# Patient Record
Sex: FEMALE | Race: WHITE | Employment: OTHER | URBAN - METROPOLITAN AREA
[De-identification: names, ages, dates, MRNs, and addresses within clinical notes are randomized per-mention and may not be internally consistent; named-entity substitution may affect disease eponyms.]

---

## 2017-06-09 PROBLEM — N81.4 UTEROVAGINAL PROLAPSE, UNSPECIFIED: Status: ACTIVE | Noted: 2017-06-09

## 2017-10-18 ENCOUNTER — LAB ENCOUNTER (OUTPATIENT)
Dept: LAB | Age: 82
End: 2017-10-18
Attending: FAMILY MEDICINE
Payer: MEDICARE

## 2017-10-18 DIAGNOSIS — I25.10 CAD (CORONARY ARTERY DISEASE): Primary | ICD-10-CM

## 2017-10-18 PROCEDURE — 84443 ASSAY THYROID STIM HORMONE: CPT

## 2017-10-18 PROCEDURE — 80053 COMPREHEN METABOLIC PANEL: CPT

## 2017-10-18 PROCEDURE — 36415 COLL VENOUS BLD VENIPUNCTURE: CPT

## 2017-10-18 PROCEDURE — 85025 COMPLETE CBC W/AUTO DIFF WBC: CPT

## 2018-02-04 ENCOUNTER — APPOINTMENT (OUTPATIENT)
Dept: GENERAL RADIOLOGY | Facility: HOSPITAL | Age: 83
End: 2018-02-04
Attending: EMERGENCY MEDICINE
Payer: MEDICARE

## 2018-02-04 ENCOUNTER — HOSPITAL ENCOUNTER (EMERGENCY)
Facility: HOSPITAL | Age: 83
Discharge: HOME | End: 2018-02-04
Attending: EMERGENCY MEDICINE
Payer: MEDICARE

## 2018-02-04 ENCOUNTER — APPOINTMENT (OUTPATIENT)
Dept: CT IMAGING | Facility: HOSPITAL | Age: 83
End: 2018-02-04
Attending: EMERGENCY MEDICINE
Payer: MEDICARE

## 2018-02-04 VITALS
TEMPERATURE: 99 F | OXYGEN SATURATION: 96 % | RESPIRATION RATE: 20 BRPM | SYSTOLIC BLOOD PRESSURE: 144 MMHG | DIASTOLIC BLOOD PRESSURE: 62 MMHG | HEIGHT: 57.99 IN | BODY MASS INDEX: 22.87 KG/M2 | HEART RATE: 90 BPM | WEIGHT: 108.94 LBS
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
## 2018-02-04 DIAGNOSIS — K59.00 CONSTIPATION, UNSPECIFIED CONSTIPATION TYPE: ICD-10-CM

## 2018-02-04 LAB
ALBUMIN SERPL-MCNC: 3.8 G/DL (ref 3.5–4.8)
ALP LIVER SERPL-CCNC: 63 U/L (ref 55–142)
ALT SERPL-CCNC: 17 U/L (ref 14–54)
AST SERPL-CCNC: 15 U/L (ref 15–41)
BASOPHILS # BLD AUTO: 0.02 X10(3) UL (ref 0–0.1)
BASOPHILS NFR BLD AUTO: 0.3 %
BILIRUB SERPL-MCNC: 0.5 MG/DL (ref 0.1–2)
BILIRUB UR QL STRIP.AUTO: NEGATIVE
BUN BLD-MCNC: 20 MG/DL (ref 8–20)
CALCIUM BLD-MCNC: 8.3 MG/DL (ref 8.3–10.3)
CHLORIDE: 103 MMOL/L (ref 101–111)
CLARITY UR REFRACT.AUTO: CLEAR
CO2: 24 MMOL/L (ref 22–32)
CREAT BLD-MCNC: 0.67 MG/DL (ref 0.55–1.02)
EOSINOPHIL # BLD AUTO: 0.13 X10(3) UL (ref 0–0.3)
EOSINOPHIL NFR BLD AUTO: 1.7 %
ERYTHROCYTE [DISTWIDTH] IN BLOOD BY AUTOMATED COUNT: 17 % (ref 11.5–16)
GLUCOSE BLD-MCNC: 132 MG/DL (ref 70–99)
GLUCOSE UR STRIP.AUTO-MCNC: NEGATIVE MG/DL
HCT VFR BLD AUTO: 35 % (ref 34–50)
HGB BLD-MCNC: 10.7 G/DL (ref 12–16)
HYALINE CASTS #/AREA URNS AUTO: PRESENT /LPF
IMMATURE GRANULOCYTE COUNT: 0.03 X10(3) UL (ref 0–1)
IMMATURE GRANULOCYTE RATIO %: 0.4 %
KETONES UR STRIP.AUTO-MCNC: NEGATIVE MG/DL
LYMPHOCYTES # BLD AUTO: 1.48 X10(3) UL (ref 0.9–4)
LYMPHOCYTES NFR BLD AUTO: 19.5 %
M PROTEIN MFR SERPL ELPH: 7.2 G/DL (ref 6.1–8.3)
MCH RBC QN AUTO: 24.5 PG (ref 27–33.2)
MCHC RBC AUTO-ENTMCNC: 30.6 G/DL (ref 31–37)
MCV RBC AUTO: 80.3 FL (ref 81–100)
MONOCYTES # BLD AUTO: 0.18 X10(3) UL (ref 0.1–0.6)
MONOCYTES NFR BLD AUTO: 2.4 %
NEUTROPHIL ABS PRELIM: 5.75 X10 (3) UL (ref 1.3–6.7)
NEUTROPHILS # BLD AUTO: 5.75 X10(3) UL (ref 1.3–6.7)
NEUTROPHILS NFR BLD AUTO: 75.7 %
NITRITE UR QL STRIP.AUTO: NEGATIVE
PH UR STRIP.AUTO: 6 [PH] (ref 4.5–8)
PLATELET # BLD AUTO: 302 10(3)UL (ref 150–450)
POTASSIUM SERPL-SCNC: 3.5 MMOL/L (ref 3.6–5.1)
PROT UR STRIP.AUTO-MCNC: NEGATIVE MG/DL
RBC # BLD AUTO: 4.36 X10(6)UL (ref 3.8–5.1)
RBC UR QL AUTO: NEGATIVE
RED CELL DISTRIBUTION WIDTH-SD: 50 FL (ref 35.1–46.3)
SODIUM SERPL-SCNC: 136 MMOL/L (ref 136–144)
SP GR UR STRIP.AUTO: 1.02 (ref 1–1.03)
UROBILINOGEN UR STRIP.AUTO-MCNC: 2 MG/DL
WBC # BLD AUTO: 7.6 X10(3) UL (ref 4–13)

## 2018-02-04 PROCEDURE — 71045 X-RAY EXAM CHEST 1 VIEW: CPT | Performed by: EMERGENCY MEDICINE

## 2018-02-04 PROCEDURE — 99285 EMERGENCY DEPT VISIT HI MDM: CPT

## 2018-02-04 PROCEDURE — 80053 COMPREHEN METABOLIC PANEL: CPT | Performed by: EMERGENCY MEDICINE

## 2018-02-04 PROCEDURE — 85025 COMPLETE CBC W/AUTO DIFF WBC: CPT | Performed by: EMERGENCY MEDICINE

## 2018-02-04 PROCEDURE — 87086 URINE CULTURE/COLONY COUNT: CPT | Performed by: EMERGENCY MEDICINE

## 2018-02-04 PROCEDURE — 81001 URINALYSIS AUTO W/SCOPE: CPT | Performed by: EMERGENCY MEDICINE

## 2018-02-04 PROCEDURE — 70450 CT HEAD/BRAIN W/O DYE: CPT | Performed by: EMERGENCY MEDICINE

## 2018-02-04 PROCEDURE — 99284 EMERGENCY DEPT VISIT MOD MDM: CPT

## 2018-02-04 PROCEDURE — 74018 RADEX ABDOMEN 1 VIEW: CPT | Performed by: EMERGENCY MEDICINE

## 2018-02-04 PROCEDURE — 96361 HYDRATE IV INFUSION ADD-ON: CPT

## 2018-02-04 PROCEDURE — 96360 HYDRATION IV INFUSION INIT: CPT

## 2018-02-04 NOTE — ED NOTES
Assisted pt to bathroom. Unable to urinate. Pt was able to have small bm. Walked back to bed with assistance. Tolerated well.

## 2018-02-04 NOTE — ED PROVIDER NOTES
Patient Seen in: [facility] Emergency Department    History   Patient presents with:  Fall (musculoskeletal, neurologic)  Constipation (gastrointestinal)    Stated Complaint: fall    HPI    35-year-old female presents for evaluation after a fall.   P Alert, oriented, no apparent distress  HEENT: Occipital scalp hematoma. Pupils equal reactive. Extraocular motions intact. Oropharynx clear. Neck: Supple, kyphosis  Lungs: Clear to auscultation bilaterally. Heart: Regular rate and rhythm.   Abdomen: S complaining of constipation but her abdomen was soft and nontender. She subsequently had a bowel movement here and felt better. Patient is neurologically intact and at baseline. No trauma on exam other than a scalp hematoma. CT scan was ordered.     CT

## 2018-05-23 ENCOUNTER — NURSE ONLY (OUTPATIENT)
Dept: LAB | Age: 83
End: 2018-05-23
Attending: FAMILY MEDICINE
Payer: MEDICARE

## 2018-05-23 DIAGNOSIS — I10 HTN (HYPERTENSION): ICD-10-CM

## 2018-05-23 DIAGNOSIS — G89.29 CHRONIC PAIN: ICD-10-CM

## 2018-05-23 DIAGNOSIS — R41.3 MEMORY CHANGE: Primary | ICD-10-CM

## 2018-05-23 PROCEDURE — 36415 COLL VENOUS BLD VENIPUNCTURE: CPT

## 2018-05-23 PROCEDURE — 80053 COMPREHEN METABOLIC PANEL: CPT

## 2018-05-23 PROCEDURE — 84443 ASSAY THYROID STIM HORMONE: CPT

## 2018-05-23 PROCEDURE — 85025 COMPLETE CBC W/AUTO DIFF WBC: CPT

## 2018-05-24 PROCEDURE — 87086 URINE CULTURE/COLONY COUNT: CPT | Performed by: FAMILY MEDICINE

## 2018-05-24 PROCEDURE — 81001 URINALYSIS AUTO W/SCOPE: CPT | Performed by: FAMILY MEDICINE

## 2018-05-26 ENCOUNTER — LAB REQUISITION (OUTPATIENT)
Dept: LAB | Facility: HOSPITAL | Age: 83
End: 2018-05-26
Attending: FAMILY MEDICINE
Payer: MEDICARE

## 2018-05-26 DIAGNOSIS — N39.0 URINARY TRACT INFECTION: ICD-10-CM

## 2018-06-03 ENCOUNTER — APPOINTMENT (OUTPATIENT)
Dept: GENERAL RADIOLOGY | Facility: HOSPITAL | Age: 83
DRG: 064 | End: 2018-06-03
Attending: EMERGENCY MEDICINE
Payer: MEDICARE

## 2018-06-03 ENCOUNTER — HOSPITAL ENCOUNTER (INPATIENT)
Facility: HOSPITAL | Age: 83
LOS: 2 days | Discharge: NURSING, REHAB OR LONG-TERM CARE | DRG: 064 | End: 2018-06-05
Attending: EMERGENCY MEDICINE | Admitting: HOSPITALIST
Payer: MEDICARE

## 2018-06-03 ENCOUNTER — APPOINTMENT (OUTPATIENT)
Dept: CT IMAGING | Facility: HOSPITAL | Age: 83
DRG: 064 | End: 2018-06-03
Attending: EMERGENCY MEDICINE
Payer: MEDICARE

## 2018-06-03 ENCOUNTER — APPOINTMENT (OUTPATIENT)
Dept: MRI IMAGING | Facility: HOSPITAL | Age: 83
DRG: 064 | End: 2018-06-03
Attending: Other
Payer: MEDICARE

## 2018-06-03 DIAGNOSIS — I63.9 ACUTE CVA (CEREBROVASCULAR ACCIDENT) (HCC): Primary | ICD-10-CM

## 2018-06-03 PROBLEM — R73.9 HYPERGLYCEMIA: Status: ACTIVE | Noted: 2018-06-03

## 2018-06-03 PROBLEM — I67.89 ISCHEMIC ENCEPHALOPATHY: Status: ACTIVE | Noted: 2018-06-03

## 2018-06-03 PROBLEM — R79.89 AZOTEMIA: Status: ACTIVE | Noted: 2018-06-03

## 2018-06-03 PROBLEM — D64.9 ANEMIA: Status: ACTIVE | Noted: 2018-06-03

## 2018-06-03 PROCEDURE — 70551 MRI BRAIN STEM W/O DYE: CPT | Performed by: OTHER

## 2018-06-03 PROCEDURE — 99223 1ST HOSP IP/OBS HIGH 75: CPT | Performed by: HOSPITALIST

## 2018-06-03 PROCEDURE — 0042T CT STROKE(DAWN BRAIN)CTA BRAIN/CTA NECK+PERF(CPT=70496/70498/0042T): CPT | Performed by: EMERGENCY MEDICINE

## 2018-06-03 PROCEDURE — 70498 CT ANGIOGRAPHY NECK: CPT | Performed by: EMERGENCY MEDICINE

## 2018-06-03 PROCEDURE — 70450 CT HEAD/BRAIN W/O DYE: CPT | Performed by: EMERGENCY MEDICINE

## 2018-06-03 PROCEDURE — 71045 X-RAY EXAM CHEST 1 VIEW: CPT | Performed by: EMERGENCY MEDICINE

## 2018-06-03 PROCEDURE — 70496 CT ANGIOGRAPHY HEAD: CPT | Performed by: EMERGENCY MEDICINE

## 2018-06-03 PROCEDURE — 99223 1ST HOSP IP/OBS HIGH 75: CPT | Performed by: OTHER

## 2018-06-03 RX ORDER — SODIUM CHLORIDE 9 MG/ML
INJECTION, SOLUTION INTRAVENOUS CONTINUOUS
Status: ACTIVE | OUTPATIENT
Start: 2018-06-03 | End: 2018-06-05

## 2018-06-03 RX ORDER — CALCIUM CARBONATE 500(1250)
1000 TABLET ORAL DAILY
Status: ON HOLD | COMMUNITY
End: 2018-06-04

## 2018-06-03 RX ORDER — DEXTROSE AND SODIUM CHLORIDE 5; .45 G/100ML; G/100ML
INJECTION, SOLUTION INTRAVENOUS CONTINUOUS
Status: ACTIVE | OUTPATIENT
Start: 2018-06-03 | End: 2018-06-03

## 2018-06-03 RX ORDER — ONDANSETRON 2 MG/ML
4 INJECTION INTRAMUSCULAR; INTRAVENOUS EVERY 6 HOURS PRN
Status: DISCONTINUED | OUTPATIENT
Start: 2018-06-03 | End: 2018-06-03

## 2018-06-03 RX ORDER — BRIMONIDINE TARTRATE 2 MG/ML
1 SOLUTION/ DROPS OPHTHALMIC 3 TIMES DAILY
Status: DISCONTINUED | OUTPATIENT
Start: 2018-06-03 | End: 2018-06-03 | Stop reason: SDUPTHER

## 2018-06-03 RX ORDER — POTASSIUM CHLORIDE 20 MEQ/1
40 TABLET, EXTENDED RELEASE ORAL EVERY 4 HOURS
Status: COMPLETED | OUTPATIENT
Start: 2018-06-03 | End: 2018-06-03

## 2018-06-03 RX ORDER — MORPHINE SULFATE 4 MG/ML
2 INJECTION, SOLUTION INTRAMUSCULAR; INTRAVENOUS EVERY 2 HOUR PRN
Status: DISCONTINUED | OUTPATIENT
Start: 2018-06-03 | End: 2018-06-05

## 2018-06-03 RX ORDER — ACETAMINOPHEN 325 MG/1
650 TABLET ORAL EVERY 6 HOURS PRN
Status: DISCONTINUED | OUTPATIENT
Start: 2018-06-03 | End: 2018-06-03

## 2018-06-03 RX ORDER — SENNOSIDES 8.6 MG
17.2 TABLET ORAL NIGHTLY
Status: DISCONTINUED | OUTPATIENT
Start: 2018-06-03 | End: 2018-06-05

## 2018-06-03 RX ORDER — SIMVASTATIN 20 MG
20 TABLET ORAL NIGHTLY
Status: ON HOLD | COMMUNITY
End: 2018-06-04

## 2018-06-03 RX ORDER — DIAZEPAM 2 MG/1
2 TABLET ORAL EVERY 6 HOURS PRN
Status: COMPLETED | OUTPATIENT
Start: 2018-06-03 | End: 2018-06-03

## 2018-06-03 RX ORDER — TIMOLOL MALEATE 5 MG/ML
1 SOLUTION/ DROPS OPHTHALMIC 2 TIMES DAILY
Status: DISCONTINUED | OUTPATIENT
Start: 2018-06-03 | End: 2018-06-05

## 2018-06-03 RX ORDER — PHENYLEPHRINE HCL IN 0.9% NACL 50MG/250ML
PLASTIC BAG, INJECTION (ML) INTRAVENOUS CONTINUOUS PRN
Status: DISCONTINUED | OUTPATIENT
Start: 2018-06-03 | End: 2018-06-05

## 2018-06-03 RX ORDER — METOCLOPRAMIDE HYDROCHLORIDE 5 MG/ML
10 INJECTION INTRAMUSCULAR; INTRAVENOUS EVERY 8 HOURS PRN
Status: DISCONTINUED | OUTPATIENT
Start: 2018-06-03 | End: 2018-06-05

## 2018-06-03 RX ORDER — DICLOFENAC 35 MG/1
75 CAPSULE ORAL DAILY
Status: ON HOLD | COMMUNITY
End: 2018-06-04

## 2018-06-03 RX ORDER — BRIMONIDINE TARTRATE 2 MG/ML
1 SOLUTION/ DROPS OPHTHALMIC 3 TIMES DAILY
Status: DISCONTINUED | OUTPATIENT
Start: 2018-06-03 | End: 2018-06-05

## 2018-06-03 RX ORDER — BISACODYL 10 MG
10 SUPPOSITORY, RECTAL RECTAL
Status: DISCONTINUED | OUTPATIENT
Start: 2018-06-03 | End: 2018-06-05

## 2018-06-03 RX ORDER — ONDANSETRON 2 MG/ML
4 INJECTION INTRAMUSCULAR; INTRAVENOUS EVERY 6 HOURS PRN
Status: DISCONTINUED | OUTPATIENT
Start: 2018-06-03 | End: 2018-06-05

## 2018-06-03 RX ORDER — ASPIRIN 300 MG
300 SUPPOSITORY, RECTAL RECTAL DAILY
Status: DISCONTINUED | OUTPATIENT
Start: 2018-06-03 | End: 2018-06-05

## 2018-06-03 RX ORDER — SODIUM PHOSPHATE, DIBASIC AND SODIUM PHOSPHATE, MONOBASIC 7; 19 G/133ML; G/133ML
1 ENEMA RECTAL ONCE AS NEEDED
Status: DISCONTINUED | OUTPATIENT
Start: 2018-06-03 | End: 2018-06-05

## 2018-06-03 RX ORDER — MORPHINE SULFATE 4 MG/ML
1 INJECTION, SOLUTION INTRAMUSCULAR; INTRAVENOUS EVERY 2 HOUR PRN
Status: DISCONTINUED | OUTPATIENT
Start: 2018-06-03 | End: 2018-06-05

## 2018-06-03 RX ORDER — OMEGA-3/DHA/EPA/FISH OIL 60 MG-90MG
500 CAPSULE ORAL DAILY
COMMUNITY
End: 2018-06-21

## 2018-06-03 RX ORDER — ACETAMINOPHEN 650 MG/1
650 SUPPOSITORY RECTAL EVERY 4 HOURS PRN
Status: DISCONTINUED | OUTPATIENT
Start: 2018-06-03 | End: 2018-06-05

## 2018-06-03 RX ORDER — ACETAMINOPHEN 325 MG/1
650 TABLET ORAL EVERY 4 HOURS PRN
Status: DISCONTINUED | OUTPATIENT
Start: 2018-06-03 | End: 2018-06-05

## 2018-06-03 RX ORDER — ATORVASTATIN CALCIUM 80 MG/1
80 TABLET, FILM COATED ORAL NIGHTLY
Status: DISCONTINUED | OUTPATIENT
Start: 2018-06-03 | End: 2018-06-04

## 2018-06-03 RX ORDER — DOCUSATE SODIUM 100 MG/1
100 CAPSULE, LIQUID FILLED ORAL 2 TIMES DAILY
Status: DISCONTINUED | OUTPATIENT
Start: 2018-06-03 | End: 2018-06-05

## 2018-06-03 RX ORDER — LABETALOL HYDROCHLORIDE 5 MG/ML
10 INJECTION, SOLUTION INTRAVENOUS EVERY 10 MIN PRN
Status: COMPLETED | OUTPATIENT
Start: 2018-06-03 | End: 2018-06-05

## 2018-06-03 RX ORDER — ACETAMINOPHEN 500 MG
500 TABLET ORAL EVERY 6 HOURS PRN
COMMUNITY

## 2018-06-03 RX ORDER — POLYETHYLENE GLYCOL 3350 17 G/17G
17 POWDER, FOR SOLUTION ORAL DAILY PRN
Status: DISCONTINUED | OUTPATIENT
Start: 2018-06-03 | End: 2018-06-05

## 2018-06-03 RX ORDER — PANTOPRAZOLE SODIUM 20 MG/1
20 TABLET, DELAYED RELEASE ORAL
Status: DISCONTINUED | OUTPATIENT
Start: 2018-06-03 | End: 2018-06-05

## 2018-06-03 RX ORDER — METOCLOPRAMIDE HYDROCHLORIDE 5 MG/ML
10 INJECTION INTRAMUSCULAR; INTRAVENOUS EVERY 8 HOURS PRN
Status: DISCONTINUED | OUTPATIENT
Start: 2018-06-03 | End: 2018-06-03

## 2018-06-03 RX ORDER — LATANOPROST 50 UG/ML
1 SOLUTION/ DROPS OPHTHALMIC NIGHTLY
Status: ON HOLD | COMMUNITY
End: 2018-06-04

## 2018-06-03 RX ORDER — LATANOPROST 50 UG/ML
1 SOLUTION/ DROPS OPHTHALMIC NIGHTLY
Status: DISCONTINUED | OUTPATIENT
Start: 2018-06-03 | End: 2018-06-04

## 2018-06-03 RX ORDER — ASPIRIN 325 MG
325 TABLET ORAL DAILY
Status: DISCONTINUED | OUTPATIENT
Start: 2018-06-03 | End: 2018-06-05

## 2018-06-03 RX ORDER — ASPIRIN 325 MG
325 TABLET ORAL DAILY
Status: ON HOLD | COMMUNITY
End: 2018-06-04

## 2018-06-03 RX ORDER — FAMOTIDINE 10 MG/ML
20 INJECTION, SOLUTION INTRAVENOUS DAILY
Status: DISCONTINUED | OUTPATIENT
Start: 2018-06-03 | End: 2018-06-03

## 2018-06-03 RX ORDER — FAMOTIDINE 20 MG/1
20 TABLET ORAL DAILY
Status: DISCONTINUED | OUTPATIENT
Start: 2018-06-03 | End: 2018-06-03

## 2018-06-03 NOTE — PLAN OF CARE
Patient admitted to 1770 with family at bedside. Patient alert and disoriented. NSR on tele. Room air. Stroke protocol started. NIH 11, facial droop, dysarthria, aphasia, right side weakness and drift, right side ataxia. MRI completed.  Positive result call

## 2018-06-03 NOTE — H&P
[facility]  History and Physical     Antony Melvin Patient Status:  Emergency    8/3/1926 MRN [id number]   Location 6[facility] Attending Jefferson Adams MD   Hosp Day # 0 PCP Janna Gutiérrez DO     Chief Complaint: Shellfish-Derived P*        Medications:    No current facility-administered medications on file prior to encounter. Current Outpatient Prescriptions on File Prior to Encounter:   AmLODIPine Besylate 2.5 MG Oral Tab TK 1 T PO D Disp:  Rfl: 11   COMBIGAN Lab  06/03/18   0701   TROP  <0.046       Imaging: Imaging data reviewed in Epic. ASSESSMENT / PLAN:     1. Acute CVA  1. CT and CTA reviewed  2. Stroke protocol  3. Neuro checks  4. PT/OT/ST when able  5. Neurology to eval  2.  Accelerated HTN, allo

## 2018-06-03 NOTE — ED PROVIDER NOTES
Patient Seen in: [facility] Emergency Department    History   Patient presents with:  Stroke (neurologic)  Fall (musculoskeletal, neurologic)  Upper Extremity Injury (musculoskeletal)    Stated Complaint:     HPI    Patient presents with acute right and well-nourished. HENT:   Head: Normocephalic. Mouth/Throat: Oropharynx is clear and moist.   Eyes: EOM are normal.   Neck: Normal range of motion. Neck supple.    Cardiovascular: Normal rate, regular rhythm, normal heart sounds and intact distal puls All other components within normal limits   TROPONIN I - Normal   PROTHROMBIN TIME (PT) - Normal   PTT, ACTIVATED - Normal   CBC WITH DIFFERENTIAL WITH PLATELET    Narrative:      The following orders were created for panel order CBC WITH DIFFERENTIAL WITH accident) [facility])  (primary encounter diagnosis)    Disposition:  Admit  6/3/2018  8:32 am    Follow-up:  No follow-up provider specified.       Medications Prescribed:  Current Discharge Medication List        Present on Admission  Date Reviewed: 6/3/2018

## 2018-06-03 NOTE — PROGRESS NOTES
06/03/18 1426   Clinical Encounter Type   Visited With Patient; Family  (Family at bedside)   Routine Visit (Responded to request for consult)   Continue Visiting No   Patient's Supportive Strategies/Resources  offered non anxious presence and pr

## 2018-06-03 NOTE — ED INITIAL ASSESSMENT (HPI)
Pt arrived via ems LW from [facility]  Last seen normal at 2000 Saturday and staff found on floor next to bed at 0610. Right side weakness, slurred speech and right facial droop   Right shoulder deformity pt reports she fell.  Daughter on the way now reports pt has

## 2018-06-03 NOTE — PROGRESS NOTES
[id number] [facility] Preliminary Note    Rose Coon Patient Status:  Inpatient    8/3/1926 MRN [id number]   [facility] 7NE-A Attending Colin Medina MD   Hosp Day # 0 PCP Renny Sullivan DO     REASON FOR EVALUATION:  Fa drugs. ALLERGIES:    Azithromycin              Shellfish-Derived P*        MEDICATIONS:    No current outpatient prescriptions on file.     Current Facility-Administered Medications:  Pantoprazole Sodium (PROTONIX) EC tab 20 mg 20 mg Oral QAM AC   0.9% old female in no acute distress. HEENT:  Normocephalic, atraumatic  LUNGS: Clear to auscultation bilaterally. HEART:  S1, S2, Regular rate and rhythm.   ABD: Soft, non tender  SKIN: Warm, dry, no rashes    NEUROLOGICAL:    This patient is alert and orien Neck 6/3/18 - left  M1 stenosis but no occlusion and questionable L MCA territory ischemia    ASSESSMENT:  Acute Fall and progressive speech disturbance it is unclear at this time what caused the fall underlying spinal stenosis vs acute ischemic event or h

## 2018-06-03 NOTE — CONSULTS
[id number] [facility] [place]  6/3/2018    11:04 AM      This is a 25-year-old female who resides in a nursing home and was noted to wake up with inability to stand and one-sided weakness this morning.   Amy Madrigal Could not identify picture and mistook them for something  CN:  WNL  Right arm and leg drift    Proportionately ataxic        IMPRESSION  Left hemispheric stroke   Based on CT perfusion, there is Tmax defect left parietal area  Intracranial stenosis M1 se

## 2018-06-03 NOTE — PROGRESS NOTES
Pharmacy note: Duplicate Proton Pump Inhibitor with Histamine 2 blocker. Nay Rodriguez is a 80year old female who has been prescribed both Famotidine and Protonix.   Pepcid was discontinued per P&T approved protocol for duplicate therapy in adult p

## 2018-06-03 NOTE — PHYSICAL THERAPY NOTE
Order received for Physical Therapy evaluation. Patient currently on bedrest per stroke protocol until 6/4 0652.   Will initiate PT services as appropriate or with upgraded activity order

## 2018-06-04 ENCOUNTER — APPOINTMENT (OUTPATIENT)
Dept: CV DIAGNOSTICS | Facility: HOSPITAL | Age: 83
DRG: 064 | End: 2018-06-04
Attending: PHYSICIAN ASSISTANT
Payer: MEDICARE

## 2018-06-04 PROCEDURE — 93306 TTE W/DOPPLER COMPLETE: CPT | Performed by: PHYSICIAN ASSISTANT

## 2018-06-04 PROCEDURE — 99233 SBSQ HOSP IP/OBS HIGH 50: CPT | Performed by: OTHER

## 2018-06-04 PROCEDURE — 99233 SBSQ HOSP IP/OBS HIGH 50: CPT | Performed by: HOSPITALIST

## 2018-06-04 RX ORDER — LATANOPROST 50 UG/ML
1 SOLUTION/ DROPS OPHTHALMIC NIGHTLY
Status: ON HOLD | COMMUNITY
End: 2018-06-04

## 2018-06-04 RX ORDER — ATORVASTATIN CALCIUM 40 MG/1
40 TABLET, FILM COATED ORAL NIGHTLY
Status: DISCONTINUED | OUTPATIENT
Start: 2018-06-04 | End: 2018-06-05

## 2018-06-04 RX ORDER — DICLOFENAC SODIUM 75 MG/1
75 TABLET, DELAYED RELEASE ORAL 2 TIMES DAILY
Status: ON HOLD | COMMUNITY
End: 2018-06-04

## 2018-06-04 RX ORDER — OMEPRAZOLE 20 MG/1
20 CAPSULE, DELAYED RELEASE ORAL DAILY
COMMUNITY

## 2018-06-04 RX ORDER — VIT A/VIT C/VIT E/ZINC/COPPER 2148-113
1 TABLET ORAL DAILY
COMMUNITY
End: 2018-06-21

## 2018-06-04 RX ORDER — TRAMADOL HYDROCHLORIDE 50 MG/1
50 TABLET ORAL EVERY 6 HOURS PRN
COMMUNITY
End: 2018-06-21

## 2018-06-04 RX ORDER — AMLODIPINE BESYLATE 2.5 MG/1
2.5 TABLET ORAL DAILY
Status: ON HOLD | COMMUNITY
End: 2018-06-05

## 2018-06-04 NOTE — PROGRESS NOTES
[id number] [facility] Progress Note    Maritzavaleria Lindriky Patient Status:  Inpatient    8/3/1926 MRN [id number]   [facility] 7NE-A Attending Emeli Urban MD   [facility] Day # 1 PCP Enrique Garcia DO         Subjective:  Marce Newell I have seen the patient independently, reviewed the history, labs and imaging, and agree with above note with following additions:  S:Reports feels stroner  O: /62 (BP Location: Right arm)   Pulse 75   Temp 98.1 °F (36.7 °C) (Oral)   Resp 18   SpO2 9

## 2018-06-04 NOTE — CM/SW NOTE
Pt is a 79 yo female admitted for CVA. Pt was +CVA. Pt lives in independent living at [facility]. Pt is  and has 5 children - 3 who live locally. Pt walks with a walker. PT/OT recommending acute rehab.   Referral made for ADOLFO LOFTON to

## 2018-06-04 NOTE — PROGRESS NOTES
[facility]  Progress Note     Latrice Dean Patient Status:  Inpatient    8/3/1926 MRN [id number]   [facility] 7NE-A Attending Parag Gil MD   Hosp Day # 1 PCP Bev Scott DO     Chief Complaint: Acute CVA    S: Pepper Ozuna 17.2 mg Oral Nightly   • docusate sodium  100 mg Oral BID   • latanoprost  1 drop Both Eyes Nightly   • brimonidine Tartrate  1 drop Both Eyes TID    And   • Timolol Maleate  1 drop Both Eyes BID       ASSESSMENT / PLAN:     1.  Acute CVA  1. CT and CTA rev

## 2018-06-04 NOTE — PLAN OF CARE
Assumed patient care at 0730. Vitals signs stable  Neuro checks Q4H   Patient alert to self, some expressive aphasia, right sided weakness, right arm droop, right facial droop. ECHO done.    PT/OT - recommending ADOLFO DAVID to eval   Tylenol given for back pa

## 2018-06-04 NOTE — OCCUPATIONAL THERAPY NOTE
OCCUPATIONAL THERAPY EVALUATION - INPATIENT     Room Number: 1281/2441-L  Evaluation Date: 6/4/2018  Type of Evaluation: Initial  Presenting Problem: acute infarct L MCA    Physician Order: IP Consult to Occupational Therapy  Reason for Therapy: ADL/IADL D when asked about vision.        OBJECTIVE  Precautions: Bed/chair alarm (SBP>120,R inattention,likely R sided visual perception issue)  Fall Risk: High fall risk    WEIGHT BEARING RESTRICTION  Weight Bearing Restriction: None                PAIN ASSESSMENT  increase time to extend fingers, needed support with wrist    Upper extremity strength is within functional limits except for the following;  Right Shoulder flexion  2-/5  Right Elbow flexion  3+/5  Right Wrist flexion  3+/5  Right   will use rae when presented to R sided visual field. When asked about double vision and specify visual issues, pt replied, \"There is a problem,\" but could not specify. Mod A to stand, mod A x 2 to take steps bed to chair, placed on L side.  Attempted to take side st deficits, maximizing patient’s ability to return safely to her prior level of function.     Patient Complexity  Occupational Profile/Medical History MODERATE - Expanded review of history including review of medical or therapy record   Specific performance d

## 2018-06-04 NOTE — PHYSICAL THERAPY NOTE
PHYSICAL THERAPY EVALUATION - INPATIENT     Room Number: 3427/3241-S  Evaluation Date: 6/4/2018  Type of Evaluation: Initial  Physician Order: PT Eval and Treat    Presenting Problem: L MCA CVA  Reason for Therapy: Mobility Dysfunction and Discharge extension  3-/5  Left Knee extension  4/5  Right Dorsiflexion  3-/5  Left Dorsiflexion  4-/5    BALANCE  Static Sitting: Fair -  Dynamic Sitting: Poor +  Static Standing: Poor -  Dynamic Standing: Dependent    ADDITIONAL TESTS  Additional Tests: Modified R place; Family present    ASSESSMENT   Patient is a 80year old female admitted on 6/3/2018 for L MCA CVA. Pertinent comorbidities and personal factors impacting therapy include HTN, glaucoma, back surgery, and h/o TSA.   In this PT evaluation, the patient p

## 2018-06-05 VITALS
OXYGEN SATURATION: 97 % | HEART RATE: 87 BPM | SYSTOLIC BLOOD PRESSURE: 106 MMHG | DIASTOLIC BLOOD PRESSURE: 72 MMHG | RESPIRATION RATE: 18 BRPM | TEMPERATURE: 98 F

## 2018-06-05 PROCEDURE — 99233 SBSQ HOSP IP/OBS HIGH 50: CPT | Performed by: OTHER

## 2018-06-05 PROCEDURE — 99239 HOSP IP/OBS DSCHRG MGMT >30: CPT | Performed by: HOSPITALIST

## 2018-06-05 RX ORDER — ASPIRIN 325 MG
325 TABLET ORAL DAILY
Qty: 30 TABLET | Refills: 0 | Status: SHIPPED | COMMUNITY
Start: 2018-06-06 | End: 2018-06-05

## 2018-06-05 RX ORDER — AMLODIPINE BESYLATE 2.5 MG/1
5 TABLET ORAL DAILY
Qty: 30 TABLET | Refills: 0 | Status: SHIPPED | OUTPATIENT
Start: 2018-06-05

## 2018-06-05 RX ORDER — ASPIRIN 325 MG
325 TABLET ORAL DAILY
Qty: 30 TABLET | Refills: 0 | Status: SHIPPED | OUTPATIENT
Start: 2018-06-06

## 2018-06-05 RX ORDER — ATORVASTATIN CALCIUM 40 MG/1
40 TABLET, FILM COATED ORAL NIGHTLY
Qty: 30 TABLET | Refills: 2 | Status: SHIPPED | OUTPATIENT
Start: 2018-06-05

## 2018-06-05 RX ORDER — AMLODIPINE BESYLATE 2.5 MG/1
5 TABLET ORAL DAILY
Qty: 30 TABLET | Refills: 0 | Status: SHIPPED | OUTPATIENT
Start: 2018-06-05 | End: 2018-06-05

## 2018-06-05 NOTE — PROGRESS NOTES
Okay to discharge per Neuro and cardiology. Report given to [facility] RN. Medications and follow up  reviewed with RN and patients family.    Sunita Al for transport

## 2018-06-05 NOTE — CONSULTS
[facility]  Cardiology Consultation    Dudley Landon Patient Status:  Inpatient    8/3/1926 MRN [id number]   [facility] 7NE-A Attending Freeman Zuniga MD   [address] Day # 2 PCP Demetrius Garcia DO     Reason for Consultation:  Marlo Boys date: HERNIA SURGERY  No date: RECONSTR TOTAL SHOULDER IMPLANT  Family History   Problem Relation Age of Onset   • Heart Disease Father    • Heart Disease Mother    • Diabetes Mother      Adult onset   • Heart Disease Brother       reports that she quit sm Tartrate (ALPHAGAN) 0.2 % ophthalmic solution 1 drop, 1 drop, Both Eyes, TID **AND** Timolol Maleate (TIMOPTIC) 0.5 % ophthalmic solution 1 drop, 1 drop, Both Eyes, BID    Review of Systems:  A comprehensive review of systems was negative if not otherwise stress test)  HTN    Recommendations:  - I reviewed echo images myself. I do not see a clot at the LV apex; certainly a clot could have been present in light of marked apical wall motion abnormality.   Risks and benefits of oral anticoagulation discussed w

## 2018-06-05 NOTE — OCCUPATIONAL THERAPY NOTE
OCCUPATIONAL THERAPY TREATMENT NOTE - INPATIENT     Room Number: 2790/9307-A  Session: 1   Number of Visits to Meet Established Goals: 7    Presenting Problem: acute infarct L MCA    History related to current admission: Pt was admitted on 6/3 from her sunil Little  -   Taking care of personal grooming such as brushing teeth?: A Little  -   Eating meals?: A Little    AM-PAC Score:  Score: 18  Approx Degree of Impairment: 46.65%  Standardized Score (AM-PAC Scale): 38.66  CMS Modifier (G-Code): CK    FUNCTIONAL level of impairment often benefit from discharging to [facility]. Pt will benefit from skilled OT services to maximize independence with ADLs.   Recommend discharge to ACUTE rehabilitation facility where pt can participate in a comprehensive and intensive thera perform upper body dressing:  with min assist  Patient will perform lower body dressing:  with mod assist and while in chair     Functional Transfer Goals  Patient will transfer to bedside commode:  with max assist     UE Exercise Program Goal  Patient mikie

## 2018-06-05 NOTE — CM/SW NOTE
Pt is ready for d/c today. Pt will go to . Called Danyelle at [facility] who said she has a bed for pt. Pt will into Room 1174. RN to call report to [phone]. Pt's family would like pt to go to [facility] by [employer].   Arranged Edw medicar to  pt at 6:30pm t

## 2018-06-05 NOTE — SLP NOTE
SPEECH/LANGUAGE/COGNITIVE EVALUATION - INPATIENT    Admission Date: 6/3/2018  Evaluation Date: 06/05/18    Reason for Referral: Stroke protocol;RN dysphagia screen (pending confirmation)    ASSESSMENT & PLAN   ASSESSMENT & IMPRESSION  Pt seen sitting uprig with no concerns of aspiration. Trial thin liquids via cup, self presented, with no overt s/s of aspiration. Stable pulmonary status. Will continue to follow.      Assessment(s) Administered: WAB Bedside Score     WAB Bedside Score: moderate-severe receptiv Plan/Recommendations: Dysphagia therapy; Aphasia therapy  Number of Visits to Meet Established Goals: 2  Follow Up Needed: Yes  SLP Follow-up Date: 06/06/18    Thank you for your referral.  If you have any questions please contact [address] [place] [address]

## 2018-06-05 NOTE — CONSULTS
[facility] Patient Status:  Inpatient    8/3/1926 MRN [id number]   [facility] 7NE-A Attending Alyssia Singh MD   [address] Day # 1 PCP Jorge Silver DO     Patient Identification  Vinod Edwards is a 80 year o maximal assist for dressing and bathing. Moderate assist for bed mobility. Not yet ambulatory.       Past Medical History:  @Medical hx@  Past Medical History:   Diagnosis Date   • Anemia    • Anemia    • Essential hypertension    • Glaucoma    • Osteopor Q6H PRN   Or      Metoclopramide HCl (REGLAN) injection 10 mg 10 mg Intravenous Q8H PRN   [] dextrose 5 %-0.45 % NaCl infusion  Intravenous Continuous   [COMPLETED] diazepam (VALIUM) tab 2 mg 2 mg Oral Q6H PRN   brimonidine Tartrate (ALPHAGAN) 0.2 % and tongue normal. Teeth and gums normal. Moist mucous membranes. Neck: No neck masses or thyroid enlargement/tenderness/nodules. Lungs:   Resonant, clear breath sounds, quiet accessory muscles. Chest wall:  No tenderness or deformity.    Cardio device needs. 24 hr rehab nursing also beneficial for medication management, pressure sore prevention, bowel and bladder management, skin management.      Physiatric medical oversight to monitor kidney function, cardiac function, pulmonary status

## 2018-06-05 NOTE — PHYSICAL THERAPY NOTE
PHYSICAL THERAPY TREATMENT NOTE - INPATIENT    Room Number: 9938/6579-G     Session: 1   Number of Visits to Meet Established Goals: 4    Presenting Problem: L MCA CVA    Problem List  Principal Problem:    Acute CVA (cerebrovascular accident) ([facility] [address]  Acti Score: 8   PT Approx Degree of Impairment Score: 86.62%   Standardized Score (AM-PAC Scale): 28.58   CMS Modifier (G-Code): CM    FUNCTIONAL ABILITY STATUS  Gait Assessment   Gait Assistance: Not tested  Distance (ft): dependent squat pivot  Assistive Polo independence/return to baseline. Recommend AR upon [facility] d/c.       DISCHARGE RECOMMENDATIONS  PT Discharge Recommendations: Acute rehabilitation     PLAN  PT Treatment Plan: Bed mobility; Neuromuscular re-educate;Strengthening;Transfer training;B

## 2018-06-05 NOTE — PROGRESS NOTES
[facility]  Progress Note     Carloz Palmer Patient Status:  Inpatient    8/3/1926 MRN [id number]   [facility] 7NE-A Attending Lexi Arrington MD   [address] Day # 2 PCP Jaida Barrett DO     Chief Complaint: Acute CVA    S: Sergio Morse sodium  100 mg Oral BID   • brimonidine Tartrate  1 drop Both Eyes TID    And   • Timolol Maleate  1 drop Both Eyes BID       ASSESSMENT / PLAN:     1. Acute CVA  1. CT and CTA reviewed  2. MRI with numerous focal acute infacts  3.  Neuro checks  4. 2D echo

## 2018-06-05 NOTE — PLAN OF CARE
Assumed care at [address]. Pt a/ox1. Nuero checks q4h per protocol. Pt w/ right sided weakness, right facial droop, aphasia. Labetalol prn given x1 for sbp >150. Incontinent of B/B. Kept clean and dry. IVF infusing per order. MJ eval done. SS to follow up.  Need

## 2018-06-05 NOTE — PLAN OF CARE
Problem: Impaired Communication  Goal: Patient will achieve maximal communication potential  Interventions:  - Provide modeling for options to improve word retrieval in known context  - Allow additional time for processing after asking questions or providi

## 2018-06-05 NOTE — PLAN OF CARE
Assumed patient care at 0730, BP better controlled. Hospitalist aware of BP overnight. Neuro checks q4H- Right sided weakness, right eye field cut. Right facial droop, right arm droop. Fall precautions maintained   MJ accepted.    Cardiology on consult

## 2018-06-05 NOTE — PROGRESS NOTES
[facility]    Progress Note    Manoj Kinsey Patient Status:  Inpatient    8/3/1926 MRN [id number]   [facility] 7NE-A Attending Valeriano Holcomb MD   [address] Day # 2 PCP Bairon Velasco DO     Subjective:  Manoj Kinsey is a(n) mg 650 mg Rectal Q4H PRN   morphINE sulfate (PF) 4 MG/ML injection 1 mg 1 mg Intravenous Q2H PRN   Or      morphINE sulfate (PF) 4 MG/ML injection 2 mg 2 mg Intravenous Q2H PRN   niCARdipine HCl in NaCl (CARDENE) 20 mg/200 ml premix infusion 5-15 mg/hr Int needing anticoagulation for cardiac thrombus, ok to start anticoagulation  Otherwise ASA should be continued at 325mg  Continue higher dose statin, I am not sure that she will be able to tolerate 80mg of lipitor, switch to 40mg and monitor over short and l

## 2018-06-12 NOTE — CDS QUERY
[facility]  Dear Doctor Thom Roman,   Clinical information (provided below) indicates an unknown status of a documented diagnosis.  For accurate ICD-10-CM code assignment to reflect severity of illness and risk of mortality,    PL

## 2018-06-18 NOTE — DISCHARGE SUMMARY
[facility]  DISCHARGE SUMMARY     Rich Fletcher Patient Status:  Inpatient    8/3/1926 MRN [id number]   [facility] 7NE-A Attending Layla Weathers [facility] Day # 2 PCP Maryana Mcgovern DO     Date of Admission: 6/3/2018 well as cardiology in consultation. She was seen by PT/OT/speech therapy. 2D echo revealed LVEF of 25% with possible left ventricular apical thrombus. Upon further review per cardiology, no clot at the LV apex with appreciated.   It was decided to not an Caps      Take 500 mg by mouth daily. Refills:  0     omeprazole 20 MG Cpdr  Commonly known as:  PRILOSEC      Take 20 mg by mouth daily. Refills:  0     PRESERVISION AREDS Tabs      Take 1 tablet by mouth daily.    Refills:  0     psyllium 28 % Pack  C

## 2018-06-21 ENCOUNTER — SNF VISIT (OUTPATIENT)
Dept: INTERNAL MEDICINE CLINIC | Age: 83
End: 2018-06-21

## 2018-06-21 VITALS
OXYGEN SATURATION: 94 % | SYSTOLIC BLOOD PRESSURE: 106 MMHG | HEART RATE: 80 BPM | RESPIRATION RATE: 16 BRPM | DIASTOLIC BLOOD PRESSURE: 51 MMHG | TEMPERATURE: 98 F

## 2018-06-21 DIAGNOSIS — R47.01 APHASIA: ICD-10-CM

## 2018-06-21 DIAGNOSIS — I10 ESSENTIAL HYPERTENSION: ICD-10-CM
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
## 2018-06-21 DIAGNOSIS — M80.00XA AGE-RELATED OSTEOPOROSIS WITH CURRENT PATHOLOGICAL FRACTURE, INITIAL ENCOUNTER: ICD-10-CM

## 2018-06-21 DIAGNOSIS — D64.9 NORMOCYTIC ANEMIA: ICD-10-CM

## 2018-06-21 DIAGNOSIS — E78.49 OTHER HYPERLIPIDEMIA: ICD-10-CM

## 2018-06-21 DIAGNOSIS — H40.10X0 OPEN-ANGLE GLAUCOMA OF BOTH EYES, UNSPECIFIED GLAUCOMA STAGE, UNSPECIFIED OPEN-ANGLE GLAUCOMA TYPE: ICD-10-CM

## 2018-06-21 DIAGNOSIS — F51.02 ADJUSTMENT INSOMNIA: ICD-10-CM

## 2018-06-21 DIAGNOSIS — R48.8 ANOMIA: ICD-10-CM

## 2018-06-21 DIAGNOSIS — I63.00 CEREBROVASCULAR ACCIDENT (CVA) DUE TO THROMBOSIS OF PRECEREBRAL ARTERY (HCC): Primary | ICD-10-CM

## 2018-06-21 PROCEDURE — 99309 SBSQ NF CARE MODERATE MDM 30: CPT | Performed by: NURSE PRACTITIONER

## 2018-06-21 RX ORDER — HEPARIN SODIUM 5000 [USP'U]/ML
5000 INJECTION, SOLUTION INTRAVENOUS; SUBCUTANEOUS EVERY 8 HOURS SCHEDULED
COMMUNITY

## 2018-06-21 RX ORDER — BENZONATATE 100 MG/1
100 CAPSULE ORAL 3 TIMES DAILY PRN
COMMUNITY

## 2018-06-21 RX ORDER — TIMOLOL MALEATE 5 MG/ML
1 SOLUTION/ DROPS OPHTHALMIC 2 TIMES DAILY
COMMUNITY

## 2018-06-21 RX ORDER — SENNA AND DOCUSATE SODIUM 50; 8.6 MG/1; MG/1
1 TABLET, FILM COATED ORAL DAILY
COMMUNITY

## 2018-06-21 RX ORDER — MELATONIN
325
COMMUNITY

## 2018-06-22 ENCOUNTER — NURSE ONLY (OUTPATIENT)
Dept: LAB | Age: 83
End: 2018-06-22
Attending: FAMILY MEDICINE
Payer: MEDICARE

## 2018-06-22 ENCOUNTER — SNF VISIT (OUTPATIENT)
Dept: INTERNAL MEDICINE CLINIC | Age: 83
End: 2018-06-22

## 2018-06-22 VITALS
SYSTOLIC BLOOD PRESSURE: 103 MMHG | DIASTOLIC BLOOD PRESSURE: 64 MMHG | RESPIRATION RATE: 18 BRPM | HEART RATE: 82 BPM | OXYGEN SATURATION: 96 % | TEMPERATURE: 98 F

## 2018-06-22 DIAGNOSIS — F51.02 ADJUSTMENT INSOMNIA: ICD-10-CM

## 2018-06-22 DIAGNOSIS — O22.30 DEEP PHLEBOTHROMBOSIS, ANTEPARTUM, WITH DELIVERY (HCC): ICD-10-CM

## 2018-06-22 DIAGNOSIS — I82.409 DEEP PHLEBOTHROMBOSIS, ANTEPARTUM, WITH DELIVERY (HCC): ICD-10-CM

## 2018-06-22 DIAGNOSIS — I63.00 CEREBROVASCULAR ACCIDENT (CVA) DUE TO THROMBOSIS OF PRECEREBRAL ARTERY (HCC): Primary | ICD-10-CM

## 2018-06-22 DIAGNOSIS — Z87.898 HISTORY OF CONFUSION: ICD-10-CM

## 2018-06-22 DIAGNOSIS — E55.9 VITAMIN D DEFICIENCY: ICD-10-CM

## 2018-06-22 DIAGNOSIS — I63.9 IMPENDING CEREBROVASCULAR ACCIDENT (HCC): Primary | ICD-10-CM

## 2018-06-22 PROCEDURE — 99308 SBSQ NF CARE LOW MDM 20: CPT | Performed by: NURSE PRACTITIONER

## 2018-06-22 PROCEDURE — 82306 VITAMIN D 25 HYDROXY: CPT

## 2018-06-22 PROCEDURE — 83735 ASSAY OF MAGNESIUM: CPT

## 2018-06-22 PROCEDURE — 85025 COMPLETE CBC W/AUTO DIFF WBC: CPT

## 2018-06-22 PROCEDURE — 80053 COMPREHEN METABOLIC PANEL: CPT

## 2018-06-22 RX ORDER — ERGOCALCIFEROL (VITAMIN D2) 1250 MCG
50000 CAPSULE ORAL WEEKLY
COMMUNITY
Start: 2018-06-22

## 2018-06-22 NOTE — PROGRESS NOTES
Naveen Speaker  : 8/3/1926  Age 80year old  female patient is admitted to Facility: The [address] [place] [address] for Rehabilitation and Medical Management.     [facility] Atul Dwight date:  18  Discharge date to [facility]:  18  ELOS:  14 days  Antic Alcohol use: Yes           3.0 - 4.2 oz/week     Glasses of wine: 5 - 7 per week      ALLERGIES:    Azithromycin              Shellfish-Derived P*        CODE STATUS:  DNR    ADVANCED CARE PLANNING TEAM: None      CURRENT MEDICATIONS     Liyah or cough   CARDIOVASCULAR:denies chest pain, no palpitations , denies syncope, denies orthopnea, denies cough  GI: denies nausea, vomiting, constipation, diarrhea; no rectal bleeding; no heartburn  :dysuria  MUSCULOSKELETAL: unable to verbalize, confused VISIT:  None    [employer], notes, lab and imaging results reviewed. Medication reconciliation completed. SEE PLAN BELOW  Falls/Osteoporosis  1. PT/OT to eval and treat  2. Tylenol 500 g q6h prn  3. Turn q2h while in bed  4.  ELOS:  14 d

## 2018-06-22 NOTE — PROGRESS NOTES
Naveen Speaker, 63/1926, 80year old, female    Chief Complaint:  Patient presents with: Follow - Up: s/p GRACE RAMEY, Vit. D. Def. Subjective:  80year old female who lives at [address] [place] [address].  Living with PMH: HTN, glaucoma, osteoporosis, H RRR; no S3, no S4; , no click, no murmur  ABDOMEN:  normal active BS+, soft, nondistended; no organomegaly, no masses; no bruits; nontender, no guarding, no rebound tenderness.   :Deferred  LYMPHATIC:no lymphedema  MUSCULOSKELETAL: no acute synovitis uppe Latest Ref Range: 34.0 - 50.0 % 29.3 (L)   Platelet Count Latest Ref Range: 150.0 - 450.0 10(3)uL 296.0   RBC Latest Ref Range: 3.80 - 5.10 x10(6)uL 3.56 (L)   MCH Latest Ref Range: 27.0 - 33.2 pg 26.1 (L)   MCHC Latest Ref Range: 31.0 - 37.0 g/dL 31.7   M prn  2. Senna-Docusate 8.6-50 mg qd      Insomnia  1. Melatonin 3 mg qhs prn     Labs  1. CBC and CMP q weekly     Follow up Appointments  1. Dr. Apryl Schrader, Neurology  2.  PCP in 7 to 10 days after discharge from [address] [place], [place] [address]DENICE Cordon  6/22/2018  3

## 2018-06-25 ENCOUNTER — SNF ADMIT/H&P (OUTPATIENT)
Dept: FAMILY MEDICINE CLINIC | Facility: CLINIC | Age: 83
End: 2018-06-25

## 2018-06-25 DIAGNOSIS — I25.5 ISCHEMIC CARDIOMYOPATHY: ICD-10-CM

## 2018-06-25 DIAGNOSIS — D64.9 ANEMIA, UNSPECIFIED TYPE: ICD-10-CM

## 2018-06-25 DIAGNOSIS — R53.1 GENERALIZED WEAKNESS: ICD-10-CM

## 2018-06-25 DIAGNOSIS — I63.9 ACUTE CVA (CEREBROVASCULAR ACCIDENT) (HCC): Primary | ICD-10-CM

## 2018-06-25 DIAGNOSIS — E78.00 PURE HYPERCHOLESTEROLEMIA: ICD-10-CM

## 2018-06-25 DIAGNOSIS — R53.81 PHYSICAL DECONDITIONING: ICD-10-CM

## 2018-06-25 DIAGNOSIS — R73.9 HYPERGLYCEMIA: ICD-10-CM

## 2018-06-25 DIAGNOSIS — R79.89 AZOTEMIA: ICD-10-CM

## 2018-06-25 DIAGNOSIS — K21.9 GASTROESOPHAGEAL REFLUX DISEASE WITHOUT ESOPHAGITIS: ICD-10-CM

## 2018-06-25 DIAGNOSIS — I67.89 ISCHEMIC ENCEPHALOPATHY: ICD-10-CM

## 2018-06-25 DIAGNOSIS — I10 ESSENTIAL HYPERTENSION: ICD-10-CM

## 2018-06-25 DIAGNOSIS — H40.9 GLAUCOMA OF BOTH EYES, UNSPECIFIED GLAUCOMA TYPE: ICD-10-CM

## 2018-06-25 PROCEDURE — 1111F DSCHRG MED/CURRENT MED MERGE: CPT | Performed by: FAMILY MEDICINE

## 2018-06-25 PROCEDURE — 99306 1ST NF CARE HIGH MDM 50: CPT | Performed by: FAMILY MEDICINE

## 2018-06-26 NOTE — PROGRESS NOTES
Rudy Angeles Author: Antwan Kendall MD     8/3/1926 MRN [id number]   [facility]  Admission 6/3/18      Last Hospital Discharge 18 PCP Shree Flor, [address] [facility] of Discharge 18       Date of Admission: 6 pain at this time. Patient has no new skin lesions. Patient reports no acute back pain and reports no dizziness or headaches. Patient reports no visual disturbances and reports hearing has been about the same.    Patient reports no recent injury or traum surgical history that includes back surgery (2014); hernia surgery; cataract (2016); and reconstr total shoulder implant. She family history includes Diabetes in her mother; Heart Disease in her brother, father, and mother.    She  reports that she quit normal reflexes. She displays normal reflexes. A cranial nerve deficit is present. She exhibits normal muscle tone. Coordination abnormal.   Skin: Skin is warm and dry. No rash noted. She is not diaphoretic. No erythema. No pallor.    Psychiatric: She has a (cpt=71045)    Result Date: 6/3/2018  PROCEDURE:  XR CHEST AP PORTABLE  (CPT=71045)  TECHNIQUE:  AP chest radiograph was obtained. COMPARISON:  AMRRY XR CHEST AP PORTABLE  (CPT=71045), 2/04/2018, 8:19.   INDICATIONS:  right side weakness  slurred speech CONCLUSION:  No acute intracranial hemorrhage is seen. There is a background of probable moderate chronic microvascular ischemic changes which are overall stable. If there is persistent concern for acute ischemia then recommend MRI.   Critical results in appearance. CTA head:  Bilateral high cervical, petrous, cavernous and supra clinoid ICA segments appear patent. Minimal calcific atherosclerosis of the cavernous segments bilaterally.    There is slight asymmetric decreased prominence of distal branch esophagitis  Glaucoma of both eyes, unspecified glaucoma type  Essential hypertension    -will continue with PT/OT, speech therapy for swallowing evaluation, continue with statin, and HTN control, heparin, continue with Ferrous sulfate, PPi, bowel regimen,

## 2018-06-29 ENCOUNTER — NURSE ONLY (OUTPATIENT)
Dept: LAB | Age: 83
End: 2018-06-29
Attending: FAMILY MEDICINE
Payer: MEDICARE

## 2018-06-29 DIAGNOSIS — O22.30 DEEP PHLEBOTHROMBOSIS, ANTEPARTUM, WITH DELIVERY (HCC): ICD-10-CM

## 2018-06-29 DIAGNOSIS — I82.409 DEEP PHLEBOTHROMBOSIS, ANTEPARTUM, WITH DELIVERY (HCC): ICD-10-CM

## 2018-06-29 DIAGNOSIS — I63.9 IMPENDING CEREBROVASCULAR ACCIDENT (HCC): Primary | ICD-10-CM

## 2018-06-29 LAB
ALBUMIN SERPL-MCNC: 2.6 G/DL (ref 3.5–4.8)
ALP LIVER SERPL-CCNC: 51 U/L (ref 55–142)
ALT SERPL-CCNC: 36 U/L (ref 14–54)
AST SERPL-CCNC: 17 U/L (ref 15–41)
BILIRUB SERPL-MCNC: 0.5 MG/DL (ref 0.1–2)
BUN BLD-MCNC: 21 MG/DL (ref 8–20)
CALCIUM BLD-MCNC: 8.3 MG/DL (ref 8.3–10.3)
CHLORIDE: 99 MMOL/L (ref 101–111)
CO2: 28 MMOL/L (ref 22–32)
CREAT BLD-MCNC: 0.54 MG/DL (ref 0.55–1.02)
ERYTHROCYTE [DISTWIDTH] IN BLOOD BY AUTOMATED COUNT: 20.8 % (ref 11.5–16)
GLUCOSE BLD-MCNC: 102 MG/DL (ref 70–99)
HCT VFR BLD AUTO: 28.1 % (ref 34–50)
HGB BLD-MCNC: 8.9 G/DL (ref 12–16)
M PROTEIN MFR SERPL ELPH: 5.8 G/DL (ref 6.1–8.3)
MCH RBC QN AUTO: 26.6 PG (ref 27–33.2)
MCHC RBC AUTO-ENTMCNC: 31.7 G/DL (ref 31–37)
MCV RBC AUTO: 83.9 FL (ref 81–100)
PLATELET # BLD AUTO: 357 10(3)UL (ref 150–450)
POTASSIUM SERPL-SCNC: 3.8 MMOL/L (ref 3.6–5.1)
RBC # BLD AUTO: 3.35 X10(6)UL (ref 3.8–5.1)
RED CELL DISTRIBUTION WIDTH-SD: 63 FL (ref 35.1–46.3)
SODIUM SERPL-SCNC: 136 MMOL/L (ref 136–144)
WBC # BLD AUTO: 5.5 X10(3) UL (ref 4–13)

## 2018-06-29 PROCEDURE — 80053 COMPREHEN METABOLIC PANEL: CPT

## 2018-06-29 PROCEDURE — 85027 COMPLETE CBC AUTOMATED: CPT

## 2018-07-02 ENCOUNTER — TELEPHONE (OUTPATIENT)
Dept: NEUROLOGY | Facility: CLINIC | Age: 83
End: 2018-07-02

## 2018-07-06 ENCOUNTER — NURSE ONLY (OUTPATIENT)
Dept: LAB | Age: 83
End: 2018-07-06
Attending: FAMILY MEDICINE
Payer: MEDICARE

## 2018-07-06 DIAGNOSIS — R69 DIAGNOSIS UNKNOWN: Primary | ICD-10-CM

## 2018-07-06 LAB
ALBUMIN SERPL-MCNC: 3.2 G/DL (ref 3.5–4.8)
ALP LIVER SERPL-CCNC: 55 U/L (ref 55–142)
ALT SERPL-CCNC: 46 U/L (ref 14–54)
AST SERPL-CCNC: 23 U/L (ref 15–41)
BASOPHILS # BLD AUTO: 0.02 X10(3) UL (ref 0–0.1)
BASOPHILS NFR BLD AUTO: 0.4 %
BILIRUB SERPL-MCNC: 0.3 MG/DL (ref 0.1–2)
BUN BLD-MCNC: 14 MG/DL (ref 8–20)
CALCIUM BLD-MCNC: 9.5 MG/DL (ref 8.3–10.3)
CHLORIDE: 101 MMOL/L (ref 101–111)
CO2: 26 MMOL/L (ref 22–32)
CREAT BLD-MCNC: 0.51 MG/DL (ref 0.55–1.02)
EOSINOPHIL # BLD AUTO: 0.12 X10(3) UL (ref 0–0.3)
EOSINOPHIL NFR BLD AUTO: 2.5 %
ERYTHROCYTE [DISTWIDTH] IN BLOOD BY AUTOMATED COUNT: 20.7 % (ref 11.5–16)
GLUCOSE BLD-MCNC: 92 MG/DL (ref 70–99)
HCT VFR BLD AUTO: 34.1 % (ref 34–50)
HGB BLD-MCNC: 10.7 G/DL (ref 12–16)
IMMATURE GRANULOCYTE COUNT: 0.01 X10(3) UL (ref 0–1)
IMMATURE GRANULOCYTE RATIO %: 0.2 %
LYMPHOCYTES # BLD AUTO: 1.23 X10(3) UL (ref 0.9–4)
LYMPHOCYTES NFR BLD AUTO: 25.4 %
M PROTEIN MFR SERPL ELPH: 6.6 G/DL (ref 6.1–8.3)
MCH RBC QN AUTO: 26.4 PG (ref 27–33.2)
MCHC RBC AUTO-ENTMCNC: 31.4 G/DL (ref 31–37)
MCV RBC AUTO: 84.2 FL (ref 81–100)
MONOCYTES # BLD AUTO: 0.39 X10(3) UL (ref 0.1–1)
MONOCYTES NFR BLD AUTO: 8 %
NEUTROPHIL ABS PRELIM: 3.08 X10 (3) UL (ref 1.3–6.7)
NEUTROPHILS # BLD AUTO: 3.08 X10(3) UL (ref 1.3–6.7)
NEUTROPHILS NFR BLD AUTO: 63.5 %
PLATELET # BLD AUTO: 385 10(3)UL (ref 150–450)
POTASSIUM SERPL-SCNC: 3.7 MMOL/L (ref 3.6–5.1)
RBC # BLD AUTO: 4.05 X10(6)UL (ref 3.8–5.1)
RED CELL DISTRIBUTION WIDTH-SD: 62.9 FL (ref 35.1–46.3)
SODIUM SERPL-SCNC: 136 MMOL/L (ref 136–144)
WBC # BLD AUTO: 4.9 X10(3) UL (ref 4–13)

## 2018-07-06 PROCEDURE — 85025 COMPLETE CBC W/AUTO DIFF WBC: CPT

## 2018-07-06 PROCEDURE — 80053 COMPREHEN METABOLIC PANEL: CPT

## 2018-07-08 ENCOUNTER — SNF DISCHARGE (OUTPATIENT)
Dept: FAMILY MEDICINE CLINIC | Facility: CLINIC | Age: 83
End: 2018-07-08

## 2018-07-08 DIAGNOSIS — K59.00 CONSTIPATION, UNSPECIFIED CONSTIPATION TYPE: ICD-10-CM

## 2018-07-08 DIAGNOSIS — R29.6 RECURRENT FALLS: ICD-10-CM

## 2018-07-08 DIAGNOSIS — I63.512 ACUTE ISCHEMIC LEFT MCA STROKE (HCC): ICD-10-CM

## 2018-07-08 DIAGNOSIS — I67.89 ISCHEMIC ENCEPHALOPATHY: ICD-10-CM

## 2018-07-08 DIAGNOSIS — R53.1 GENERALIZED WEAKNESS: ICD-10-CM

## 2018-07-08 DIAGNOSIS — D64.9 CHRONIC ANEMIA: ICD-10-CM

## 2018-07-08 DIAGNOSIS — R73.9 HYPERGLYCEMIA: ICD-10-CM

## 2018-07-08 DIAGNOSIS — I25.5 ISCHEMIC CARDIOMYOPATHY: ICD-10-CM

## 2018-07-08 DIAGNOSIS — M81.8 OTHER OSTEOPOROSIS WITHOUT CURRENT PATHOLOGICAL FRACTURE: ICD-10-CM

## 2018-07-08 DIAGNOSIS — R48.8 ANOMIA: ICD-10-CM

## 2018-07-08 DIAGNOSIS — E78.00 PURE HYPERCHOLESTEROLEMIA: ICD-10-CM

## 2018-07-08 DIAGNOSIS — H40.9 GLAUCOMA OF BOTH EYES, UNSPECIFIED GLAUCOMA TYPE: ICD-10-CM

## 2018-07-08 DIAGNOSIS — R79.89 AZOTEMIA: ICD-10-CM

## 2018-07-08 DIAGNOSIS — I63.9 ACUTE CVA (CEREBROVASCULAR ACCIDENT) (HCC): Primary | ICD-10-CM

## 2018-07-08 DIAGNOSIS — G47.00 INSOMNIA, UNSPECIFIED TYPE: ICD-10-CM

## 2018-07-08 PROCEDURE — 99315 NF DSCHRG MGMT 30 MIN/LESS: CPT | Performed by: FAMILY MEDICINE

## 2018-07-10 ENCOUNTER — SNF DISCHARGE (OUTPATIENT)
Dept: INTERNAL MEDICINE CLINIC | Age: 83
End: 2018-07-10

## 2018-07-10 DIAGNOSIS — F51.02 ADJUSTMENT INSOMNIA: ICD-10-CM

## 2018-07-10 DIAGNOSIS — M80.00XA AGE-RELATED OSTEOPOROSIS WITH CURRENT PATHOLOGICAL FRACTURE, INITIAL ENCOUNTER: ICD-10-CM
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
## 2018-07-10 DIAGNOSIS — I63.00 CEREBROVASCULAR ACCIDENT (CVA) DUE TO THROMBOSIS OF PRECEREBRAL ARTERY (HCC): Primary | ICD-10-CM

## 2018-07-10 DIAGNOSIS — E55.9 VITAMIN D DEFICIENCY: ICD-10-CM

## 2018-07-10 PROCEDURE — 99315 NF DSCHRG MGMT 30 MIN/LESS: CPT | Performed by: NURSE PRACTITIONER

## 2018-07-13 NOTE — PROGRESS NOTES
Thea El, 63/1926, 80year old, female is being discharged from Facility: [facility] at [address] [facility]    Date of [facility]    Date of Discharge:7/10/18                                 Admitting Diagnoses:s/p lef organomegaly, no masses; no bruits; nontender, no guarding, no rebound tenderness.   :Deferred  LYMPHATIC:no lymphedema  MUSCULOSKELETAL: no acute synovitis upper or lower extremity  EXTREMITIES/VASCULAR:no cyanosis, clubbing or edema, radial pulses 2+ an 3.80 - 5.10 x10(6)uL 4.05   MCH Latest Ref Range: 27.0 - 33.2 pg 26.4 (L)   MCHC Latest Ref Range: 31.0 - 37.0 g/dL 31.4   MCV Latest Ref Range: 81.0 - 100.0 fL 84.2   RDW Latest Ref Range: 11.5 - 16.0 % 20.7 (H)   RDW-SD Latest Ref Range: 35.1 - 46.3 fL 6

## 2018-07-24 ENCOUNTER — MED REC SCAN ONLY (OUTPATIENT)
Dept: FAMILY MEDICINE CLINIC | Facility: CLINIC | Age: 83
End: 2018-07-24

## 2018-07-28 NOTE — PROGRESS NOTES
[facility]   Discharge Note to be discharged on 7/10/18, late entry      [address]: Janet Morrison MD     8/3/1926 MRN [id number]   [facility]  Admission 6/3/18      Last Hospital Discharge 18 PCP Aliyah Simon DO denies headaches or visual disturbances. Patient denies any abdominal pain at this time. Patient has no new skin lesions. Patient reports no acute back pain and reports no dizziness or headaches.    Patient reports no visual disturbances and reports hear hypertension; Glaucoma; Osteoporosis; and Presence of pessary. She  has a past surgical history that includes back surgery (2014); hernia surgery; cataract (2016); and reconstr total shoulder implant.     She family history includes Diabetes in her mothe normal reflexes. She displays normal reflexes. A cranial nerve deficit is present. She exhibits normal muscle tone. Coordination abnormal.   Minimal verbal response. No signs of pain. Skin: Skin is warm and dry. No rash noted. She is not diaphoretic.  No Nancy Deras MD            Xr Chest Ap Portable  (cpt=71045)    Result Date: 6/3/2018  PROCEDURE:  XR CHEST AP PORTABLE  (CPT=71045)  TECHNIQUE:  AP chest radiograph was obtained. COMPARISON:  [facility] XR CHEST AP PORTABLE  (CPT=71045), 2/04/2018, 8:19.   IN sinuses and mastoid airspaces appear clear. CONCLUSION:  No acute intracranial hemorrhage is seen. There is a background of probable moderate chronic microvascular ischemic changes which are overall stable.   If there is persistent concern for acute i tissues of the neck are otherwise unremarkable in appearance. CTA head:  Bilateral high cervical, petrous, cavernous and supra clinoid ICA segments appear patent. Minimal calcific atherosclerosis of the cavernous segments bilaterally.    There is slight a continue with statin, asa, norvasc, heparin    3. Hyperglycemia  -stable, watching labs. 4. Encephalopathy, ISCHEMIC  -stable, CPm    5. Azotemia  -stable renal functions    6. Pure hypercholesterolemia  -continue with statin    7.  Ischemic cardiomyopa

## 2018-08-15 ENCOUNTER — NURSE ONLY (OUTPATIENT)
Dept: LAB | Age: 83
End: 2018-08-15
Attending: FAMILY MEDICINE
Payer: MEDICARE

## 2018-08-15 DIAGNOSIS — R35.0 URINE FREQUENCY: Primary | ICD-10-CM

## 2018-08-15 LAB
BILIRUB UR QL STRIP.AUTO: NEGATIVE
COLOR UR AUTO: YELLOW
GLUCOSE UR STRIP.AUTO-MCNC: NEGATIVE MG/DL
KETONES UR STRIP.AUTO-MCNC: NEGATIVE MG/DL
NITRITE UR QL STRIP.AUTO: NEGATIVE
PH UR STRIP.AUTO: 6 [PH] (ref 4.5–8)
PROT UR STRIP.AUTO-MCNC: NEGATIVE MG/DL
RBC UR QL AUTO: NEGATIVE
SP GR UR STRIP.AUTO: 1.02 (ref 1–1.03)
UROBILINOGEN UR STRIP.AUTO-MCNC: <2 MG/DL

## 2018-08-15 PROCEDURE — 81001 URINALYSIS AUTO W/SCOPE: CPT

## 2018-08-15 PROCEDURE — 87086 URINE CULTURE/COLONY COUNT: CPT

## 2018-08-15 PROCEDURE — 87077 CULTURE AEROBIC IDENTIFY: CPT

## 2018-09-12 ENCOUNTER — NURSE ONLY (OUTPATIENT)
Dept: LAB | Age: 83
End: 2018-09-12
Attending: FAMILY MEDICINE
Payer: MEDICARE

## 2018-09-12 DIAGNOSIS — I50.9 CHF (CONGESTIVE HEART FAILURE) (HCC): Primary | ICD-10-CM

## 2018-09-12 LAB
ANION GAP SERPL CALC-SCNC: 8 MMOL/L (ref 0–18)
BNP SERPL-MCNC: 120 PG/ML (ref 2–99)
BUN BLD-MCNC: 15 MG/DL (ref 8–20)
BUN/CREAT SERPL: 21.7 (ref 10–20)
CALCIUM BLD-MCNC: 8.5 MG/DL (ref 8.3–10.3)
CHLORIDE SERPL-SCNC: 91 MMOL/L (ref 101–111)
CO2 SERPL-SCNC: 28 MMOL/L (ref 22–32)
CREAT BLD-MCNC: 0.69 MG/DL (ref 0.55–1.02)
GLUCOSE BLD-MCNC: 138 MG/DL (ref 70–99)
OSMOLALITY SERPL CALC.SUM OF ELEC: 267 MOSM/KG (ref 275–295)
POTASSIUM SERPL-SCNC: 3.4 MMOL/L (ref 3.6–5.1)
SODIUM SERPL-SCNC: 127 MMOL/L (ref 136–144)

## 2018-09-12 PROCEDURE — 36415 COLL VENOUS BLD VENIPUNCTURE: CPT

## 2018-09-12 PROCEDURE — 80048 BASIC METABOLIC PNL TOTAL CA: CPT

## 2018-09-12 PROCEDURE — 83880 ASSAY OF NATRIURETIC PEPTIDE: CPT

## 2018-09-19 ENCOUNTER — NURSE ONLY (OUTPATIENT)
Dept: LAB | Age: 83
End: 2018-09-19
Attending: FAMILY MEDICINE
Payer: MEDICARE

## 2018-09-19 DIAGNOSIS — E87.6 HYPOKALEMIA: Primary | ICD-10-CM

## 2018-09-19 LAB
ANION GAP SERPL CALC-SCNC: 10 MMOL/L (ref 0–18)
BUN BLD-MCNC: 13 MG/DL (ref 8–20)
BUN/CREAT SERPL: 21.7 (ref 10–20)
CALCIUM BLD-MCNC: 8.4 MG/DL (ref 8.3–10.3)
CHLORIDE SERPL-SCNC: 85 MMOL/L (ref 101–111)
CO2 SERPL-SCNC: 28 MMOL/L (ref 22–32)
CREAT BLD-MCNC: 0.6 MG/DL (ref 0.55–1.02)
GLUCOSE BLD-MCNC: 103 MG/DL (ref 70–99)
OSMOLALITY SERPL CALC.SUM OF ELEC: 256 MOSM/KG (ref 275–295)
POTASSIUM SERPL-SCNC: 3.4 MMOL/L (ref 3.6–5.1)
SODIUM SERPL-SCNC: 123 MMOL/L (ref 136–144)

## 2018-09-19 PROCEDURE — 80048 BASIC METABOLIC PNL TOTAL CA: CPT

## 2018-09-19 PROCEDURE — 36415 COLL VENOUS BLD VENIPUNCTURE: CPT

## 2018-10-10 ENCOUNTER — NURSE ONLY (OUTPATIENT)
Dept: LAB | Age: 83
End: 2018-10-10
Attending: FAMILY MEDICINE
Payer: MEDICARE

## 2018-10-10 DIAGNOSIS — I50.9 CHF (CONGESTIVE HEART FAILURE) (HCC): Primary | ICD-10-CM

## 2018-10-10 PROCEDURE — 36415 COLL VENOUS BLD VENIPUNCTURE: CPT

## 2018-10-10 PROCEDURE — 80053 COMPREHEN METABOLIC PANEL: CPT

## 2018-10-10 PROCEDURE — 83880 ASSAY OF NATRIURETIC PEPTIDE: CPT

## 2022-06-23 NOTE — ED AVS SNAPSHOT
Ronald Gama   MRN: [id number]    Department:  [facility] Emergency Department   Date of Visit:  2/4/2018           Disclosure     Insurance plans vary and the physician(s) referred by the ER may not be covered by your plan.  Please contact y SHE WILL F/U WITH NEUROLOGY AS PLANNED    tell this physician (or your personal doctor if your instructions are to return to your personal doctor) about any new or lasting problems. The primary care or specialist physician will see patients referred from the [facility] Emergency Department.  Kirstin Suresh

## (undated) NOTE — IP AVS SNAPSHOT
Patient Demographics     Address  [place] [address] Phone  [phone] [facility]) *Preferred*  [phone] [facility])      Emergency Contact(s)     Name Mark Urrutia Daughter [phone] Take 500 mg by mouth daily. omeprazole 20 MG Cpdr  Commonly known as:  PRILOSEC  Next dose due:  6/6      Take 20 mg by mouth daily. PRESERVISION AREDS Tabs  Next dose due:  6/6      Take 1 tablet by mouth daily.           psyllium 28 % P Recent Vital Signs    Flowsheet Row Most Recent Value   Vitals  102/54 Filed at 06/05/2018 1300   Pulse  85 Filed at 06/05/2018 1300   Resp  18 Filed at 06/05/2018 1300   Temp  97.8 °F (36.6 °C) Filed at 06/05/2018 1300   SpO2  98 % Filed at 06/05/2018 130 complaints. She is not able to tell me whether or not she has a headache, focal weakness, nausea, difficulty swallowing, or blurry vision. [DK.2]    Past Medical History:  Past Medical History:   Diagnosis Date   • Essential hypertension    • Glaucoma    • Abdomen: Soft, nontender, nondistended. Positive bowel sounds. No rebound, guarding or organomegaly. Neurologic:[DK.1] Dysarthria, difficulty following commands[DK. 3]  Musculoskeletal: Moves all extremities. Extremities: No edema or cyanosis.   Integumen Related Notes:  Addendum by Lilibeth Renteria MD (Physician) filed at 2018 10:39 AM         [facility]  History and Physical     Ariellelobo Galindo Patient Status:  Emergency    8/3/1926 MRN [id number]   Location [address] [facility] • Diabetes Mother      Adult onset   • Heart Disease Brother        Allergies:   Azithromycin              Shellfish-Derived P*        Medications:    No current facility-administered medications on file prior to encounter.    Current Outpatient Prescriptio Estimated Creatinine Clearance: 39.7 mL/min (based on SCr of 0.7 mg/dL). Recent Labs   Lab  06/03/18 0701   PTP  12.7   INR  0.92       Recent Labs   Lab  06/03/18 0701   TROP  <0.046       Imaging: Imaging data reviewed in Epic.       ASSESSMENT / P Records reviewed, and patient examined. Consult Requested by:   This is a 80-year-old right-handed female who lives at [facility] independent living and gets around independently with a walker and does all of her own ADL independently who [COMPLETED] iohexol (OMNIPAQUE) 350 MG/ML injection 100 mL 100 mL Intravenous ONCE PRN   Pantoprazole Sodium (PROTONIX) EC tab 20 mg 20 mg Oral QAM AC   0.9%  NaCl infusion  Intravenous Continuous   acetaminophen (TYLENOL) tab 650 mg 650 mg Oral Q4H PRN Alcohol use Yes  3.0 - 4.2 oz/week    5 - 7 Glasses of wine per week            Family History   Problem Relation Age of Onset   • Heart Disease Father    • Heart Disease Mother    • Diabetes Mother      Adult onset   • Heart Disease [address]. 2] Right Upper Extremity:  Strength is 4+. ROM WNL. Left Upper Extremity:  Strength is 5. ROM WNL. Right Lower Extremity:  Strength  is 4+. ROM WNL. Left Lower Extremity: Strength  is 5. ROM WNL. Neuro: CNII-XII are grossly intact.  Sensat Stroke education done with patient. DVT prophylaxis  Constipation protocol  Time voiding. Check postvoid bladder scan and cath over 400 cc                              Advance directives reviewed and noted.           Would be happy to reassess should medi Patient’s self-stated goal is - be functional again    OBJECTIVE[AR.1]  Precautions: Bed/chair alarm (SBP>120,R inattention,likely R sided visual perception issue)[AR.2]    WEIGHT BEARING RESTRICTION[AR.1]  Weight Bearing Restriction: None[AR.2] placement/support. Pt with difficulty with static sitting initially, although with cues for hand placement able to perform at CGA x 5 minutes. Pt performed sit<>stand to RW at Mod A x 2.   Pt unable to off load RLE for functional steps - performed depend Goal #3 Stand pivot transfer with RW with mod assist of 1      Goal #4     Goal #5     Goal #6     Goal Comments: Goals established on 6/4/2018 - all goals ongoing as of[AR.1] 6/5/2018[AR.3]            Attribution Key    AR. 1 - Monica Cordero, PTA on 6/5 Prior Level of [place]: ambulates 150 ft with rollator to dining room    SUBJECTIVE  \"I don't know if I can get up. \"    Patient self-stated goal is none stated    OBJECTIVE[KT. 1]  Precautions: Bed/chair alarm (SBP>120,R inattention,likely R sided vi Raw Score: 8   PT Approx Degree of Impairment Score: 86.62%   Standardized Score (AM-PAC Scale): 28.58   CMS Modifier (G-Code): CM[KT. 2]    FUNCTIONAL ABILITY STATUS  Gait Assessment[KT. 1]   Gait Assistance: Not tested (unable)           Stoop/Curb Assista These impairments and comorbidities manifest themselves as functional limitations in independent bed mobility, transfers, and gait.   The patient is below baseline and would benefit from skilled inpatient PT to address the above deficits to assist patient i Physical Therapy Note signed by Jefe Sales PT at 6/3/2018  2:39 PM  Version 1 of 2    Author:  Jefe Sales PT Service:  (none) Author Type:  Physical Therapist    Filed:  6/3/2018  2:39 PM Date of Service:  6/3/2018  2:38 PM Status:  Signed Past Medical History  Past Medical History:   Diagnosis Date   • Anemia    • Anemia    • Essential hypertension    • Glaucoma    • Osteoporosis    • Presence of pessary        Past Surgical History  Past Surgical History:  2014: BACK SURGERY  2016: Rhonda Wright take steps to the chair. Provided max A x 2, but unable to weight shift to R side. R shoulder about 1/4 ROM. Educated the pt about AAROM using L hand. 10 reps with visual demonstration and min cueing. Completed  test and PHQ 9.    Patient End of Ses This patients scores reflects deficiency for hand strength which could adversely affect ability to perform fine and gross motor ADL/IADL tasks such as dressing, bathing, grooming ,hygiene, homemaking, and driving.            The patient is functioning below Filed:  6/4/2018  2:21 PM Date of Service:  6/4/2018  2:02 PM Status:  Signed    :  Esther Maldonado OT (Occupational Therapist)       OCCUPATIONAL THERAPY EVALUATION - INPATIENT     Room Number: 2755/1438-K  Evaluation Date: 6/4/2018  Type of Ev Prior Level of Function:[MS.1] Lives at [address] [place] [address]. Independent with all ADL, including showering. Rn manages medication for her. Pt ambulated to dining room once/day with rollator. [MS.2]      SUBJECTIVE[MS.1]   \"You see, there Light touch:  intact[MS. 2]    Communication:[MS.1] clear speech[MS. 2]    Behavioral/Emotional/Social:[MS.1] encouragement needed to participate in therapy initially[MS. 2]    RANGE OF MOTION AND STRENGTH ASSESSMENT  Upper extremity ROM is within functional FUNCTIONAL TRANSFER ASSESSMENT[MS.1]  Supine to Sit : Maximum assistance  Sit to Stand: Moderate assistance[MS. 2]    Skilled Therapy Provided:[MS.1] Initially, required min encouragement to participate in therapy.  Supportive daughter present during the ses daily living, rest and sleep, work, leisure and social participation. [MS.1]  Based on AM-PAC Daily Living Assessment, patient presents with 63.03% degree of ADL impairment.   Research supports that patient with this level of impairment often benefit from [facility] Patient will perform upper body dressing:  with min assist  Patient will perform lower body dressing:  with mod assist and while in chair[MS. 2]    Functional Transfer Goals[MS.1]  Patient will transfer to bedside commode:  with max assist[MS. 2]    UE Exerc receptive and expressive language deficit characterized by decreased spontaneous speech content, decreased spontaneous speech fluency, decreased auditory comprehension of y/n reliability, decreased sequential commands, and decreased confrontational object over 3 session(s). In Progress   Goal #4 The patient/family/caregiver will demonstrate understanding and implementation of aspiration precautions and swallow strategies independently over 3 session(s).    In Progress   Goal #5 The patient will complete a s